# Patient Record
Sex: MALE | Race: WHITE | NOT HISPANIC OR LATINO | Employment: UNEMPLOYED | URBAN - METROPOLITAN AREA
[De-identification: names, ages, dates, MRNs, and addresses within clinical notes are randomized per-mention and may not be internally consistent; named-entity substitution may affect disease eponyms.]

---

## 2021-05-26 ENCOUNTER — RECORDS - HEALTHEAST (OUTPATIENT)
Dept: ADMINISTRATIVE | Facility: CLINIC | Age: 41
End: 2021-05-26

## 2024-02-18 ENCOUNTER — APPOINTMENT (OUTPATIENT)
Dept: RADIOLOGY | Facility: CLINIC | Age: 44
End: 2024-02-18
Attending: EMERGENCY MEDICINE
Payer: COMMERCIAL

## 2024-02-18 ENCOUNTER — HOSPITAL ENCOUNTER (EMERGENCY)
Facility: CLINIC | Age: 44
Discharge: HOME | End: 2024-02-18
Attending: EMERGENCY MEDICINE | Admitting: EMERGENCY MEDICINE
Payer: COMMERCIAL

## 2024-02-18 VITALS
HEIGHT: 76 IN | DIASTOLIC BLOOD PRESSURE: 92 MMHG | OXYGEN SATURATION: 98 % | RESPIRATION RATE: 16 BRPM | BODY MASS INDEX: 21.31 KG/M2 | TEMPERATURE: 97 F | HEART RATE: 103 BPM | WEIGHT: 175 LBS | SYSTOLIC BLOOD PRESSURE: 135 MMHG

## 2024-02-18 DIAGNOSIS — S61.412A LACERATION OF LEFT HAND, FOREIGN BODY PRESENCE UNSPECIFIED, INITIAL ENCOUNTER: ICD-10-CM

## 2024-02-18 PROCEDURE — 12001 RPR S/N/AX/GEN/TRNK 2.5CM/<: CPT

## 2024-02-18 PROCEDURE — 99283 EMERGENCY DEPT VISIT LOW MDM: CPT | Mod: 25

## 2024-02-18 PROCEDURE — 250N000011 HC RX IP 250 OP 636: Performed by: EMERGENCY MEDICINE

## 2024-02-18 PROCEDURE — 90715 TDAP VACCINE 7 YRS/> IM: CPT | Performed by: EMERGENCY MEDICINE

## 2024-02-18 PROCEDURE — 90471 IMMUNIZATION ADMIN: CPT | Performed by: EMERGENCY MEDICINE

## 2024-02-18 PROCEDURE — 73130 X-RAY EXAM OF HAND: CPT | Mod: LT

## 2024-02-18 RX ADMIN — CLOSTRIDIUM TETANI TOXOID ANTIGEN (FORMALDEHYDE INACTIVATED), CORYNEBACTERIUM DIPHTHERIAE TOXOID ANTIGEN (FORMALDEHYDE INACTIVATED), BORDETELLA PERTUSSIS TOXOID ANTIGEN (GLUTARALDEHYDE INACTIVATED), BORDETELLA PERTUSSIS FILAMENTOUS HEMAGGLUTININ ANTIGEN (FORMALDEHYDE INACTIVATED), BORDETELLA PERTUSSIS PERTACTIN ANTIGEN, AND BORDETELLA PERTUSSIS FIMBRIAE 2/3 ANTIGEN 0.5 ML: 5; 2; 2.5; 5; 3; 5 INJECTION, SUSPENSION INTRAMUSCULAR at 01:45

## 2024-02-18 NOTE — ED PROVIDER NOTES
EMERGENCY DEPARTMENT ENCOUNTER      NAME: Francisco Way  AGE: 43 year old male  YOB: 1980  MRN: [id number]  EVALUATION DATE & TIME: 2/18/2024  1:12 AM    PCP: System, Provider Not In    ED PROVIDER: Cheyenne Crawford M.D.      Chief Complaint   Patient presents with    Laceration     Left hand          FINAL IMPRESSION:  1. Laceration of left hand, foreign body presence unspecified, initial encounter        MEDICAL DECISION MAKING:    Pertinent Labs & Imaging studies reviewed. (See chart for details)  ED Course as of 02/18/24 0214   Sun Feb 18, 2024   0136 Afebrile.  Vital signs here with tachycardia though patient is quite agitated.  He is coming in with laceration to the palm of his left hand.  Says he fell in his aunts parking garage.  He thinks he cut his hand on a piece of glass.  No other injury sustained.  Unknown last tetanus.    Reviewed, last tetanus was in 2015 so we will update here.    Patient here initially quite histrionic, screaming in the room.  He has a 2 cm laceration to his thenar eminence on the left-hand side.  On evaluation he has a small surface arterial bleed.  This was relieved with direct pressure.  Moving the rest of his fingers appropriately.  Cap refill appropriate on distal nailbeds.  No sensory deficits.    Area was anesthetized.  Was cleaned out here with 250 mL of pressurized saline.  Will get an x-ray to evaluate for any foreign body.       0210 Patient's x-ray here reveals a small 3 mm foreign body.  Area was flushed, wound was explored.  No foreign body visualized.  No further bleeding and no further arterial bleeding.  Area was closed with three 4-0 Ethilon sutures.  Patient tolerated procedure well.  Much more calm at this time.  Plan to discharge home with follow-up with [facility] if needed.         Medical Decision Making  Obtained supplemental history:Supplemental history obtained?: Family Member/Significant Other  Reviewed external records: External  records reviewed?: Documented in chart and Inpatient Record: Primary care doctor's office visit from 12/20/2018.  Care impacted by chronic illness:N/A  Care significantly affected by social determinants of health:N/A  Did you consider but not order tests?: Work up considered but not performed and documented in chart, if applicable  Did you interpret images independently?: Independent interpretation of ECG and images noted in documentation, when applicable.  Consultation discussion with other provider:Did you involve another provider (consultant, , pharmacy, etc.)?: No  Discharge. No recommendations on prescription strength medication(s). See documentation for any additional details.      Critical care: 0 minutes excluding separately billable procedures.  Includes bedside management, time reviewing test results, review of records, discussing the case with staff, documenting the medical record and time spent with family members (or surrogate decision makers) discussing specific treatment issues.          ED COURSE:  1:28 AM I met the patient and performed my initial interview and exam.   1:55 AM I performed the laceration repair procedure.    The importance of close follow up was discussed. We reviewed warning signs and symptoms, and I instructed Mr. Way to return to the emergency department immediately if he develops any new or worsening symptoms. I provided additional verbal discharge instructions. Mr. Way expressed understanding and agreement with this plan of care, his questions were answered, and he was discharged in stable condition.     MEDICATIONS GIVEN IN THE EMERGENCY:  Medications   Tdap (tetanus-diphtheria-acell pertussis) (ADACEL) injection 0.5 mL (0.5 mLs Intramuscular $Given 2/18/24 0807)       NEW PRESCRIPTIONS STARTED AT TODAY'S ER VISIT:  New Prescriptions    No medications on file          =================================================================    HPI    Patient information was  "obtained from: Patient     Use of : N/A         Francisco Way is a 43 year old male who presents to the ED by private vehicle for evaluation of a left hand laceration.    He is coming in with laceration to the palm of his left hand.  Says he fell in his aunts parking garage.  He thinks he cut his hand on a piece of glass.  No other injury sustained.  Last tetanus was in 2015.      REVIEW OF SYSTEMS   Review of Systems   Skin:         Positive for laceration on palm of left hand.   All other systems reviewed and are negative.      PAST MEDICAL HISTORY:  History reviewed. No pertinent past medical history.    PAST SURGICAL HISTORY:  History reviewed. No pertinent surgical history.    CURRENT MEDICATIONS:    No current facility-administered medications for this encounter.  No current outpatient medications on file.    ALLERGIES:  Allergies   Allergen Reactions    Sulfa Antibiotics        FAMILY HISTORY:  History reviewed. No pertinent family history.    SOCIAL HISTORY:   Social History     Socioeconomic History    Marital status: Single       PHYSICAL EXAM:    Vitals: BP (!) 135/92   Pulse 103   Temp 97  F (36.1  C) (Oral)   Resp 16   Ht 1.93 m (6' 4\")   Wt 79.4 kg (175 lb)   SpO2 98%   BMI 21.30 kg/m     General:. Alert and interactive, histrionic, screaming in the room.  HENT: Oropharynx without erythema or exudates. MMM.  TMs clear bilaterally.  Eyes: Pupils mid-sized and equally reactive.   Neck: Full AROM.  No midline tenderness to palpation.  Cardiovascular: Regular rate and rhythm. Peripheral pulses 2+ bilaterally.  Chest/Pulmonary: Normal work of breathing. Lung sounds clear and equal throughout, no wheezes or crackles. No chest wall tenderness or deformities.  Abdomen: Soft, nondistended. Nontender without guarding or rebound.  Back/Spine: No CVA or midline tenderness.  Extremities: Normal ROM of all major joints. No lower extremity edema.   Skin: Warm and dry. Normal skin color. 2 cm " laceration to his thenar eminence on the left-hand side, small surface arterial bleed. Cap refill appropriate on distal nailbeds.   Neuro: Speech clear. CNs grossly intact. Moves all extremities appropriately. Strength and sensation grossly intact to all extremities. Moving the rest of his fingers appropriately. No sensory deficits.  Psych: Normal affect/mood, cooperative, memory appropriate.       LAB:  All pertinent labs reviewed and interpreted.  Labs Ordered and Resulted from Time of ED Arrival to Time of ED Departure - No data to display    RADIOLOGY:  XR Hand Left G/E 3 Views   Final Result   IMPRESSION: Normal joint spaces and alignment. No fracture. A 3 mm radiopaque foreign body is seen on the palmar soft tissue on the lateral view only, exact position unclear but possibly in the thenar region.            PROCEDURES:   PROCEDURE: Laceration Repair   INDICATIONS: Laceration   PROCEDURE PROVIDER: Dr Tamara Crawford   SITE: Thenar eminence on the left-hand side    TYPE/SIZE: simple, clean, and no foreign body visualized  2 cm (total length)   FUNCTIONAL ASSESSMENT: Distal sensation, circulation, and motor intact   MEDICATION: 5 mLs of 1% Lidocaine with epinephrine   PREPARATION: irrigation with Normal saline   DEBRIDEMENT: wound explored, no foreign body found   CLOSURE:  Superficial layer closed with 3 stitches of 4-0 Ethilon simple interrupted    Total number of sutures/staples placed: 3         I, Delma Quach, am serving as a scribe to document services personally performed by Dr. Cheyenne Crawford  based on my observation and the provider's statements to me. I, Cheyenne Crawford MD attest that Delma Quach is acting in a scribe capacity, has observed my performance of the services and has documented them in accordance with my direction.      Cheyenne Crawford M.D.  Emergency Medicine  [facility] EMERGENCY ROOM  [address]  [id number]  [phone]  Dept: [phone]     Cheyenne Crawford MD  02/18/24 0224

## 2024-02-18 NOTE — ED TRIAGE NOTES
Patient reports falling 1 hour pta in Aunt's parking garage, sustained puncture to left hand. Unsure what he cut his hand on      Triage Assessment (Adult)       Row Name 02/18/24 0101          Triage Assessment    Airway WDL WDL        Respiratory WDL    Respiratory WDL WDL        Skin Circulation/Temperature WDL    Skin Circulation/Temperature WDL WDL        Cardiac WDL    Cardiac WDL WDL        Peripheral/Neurovascular WDL    Peripheral Neurovascular WDL WDL        Cognitive/Neuro/Behavioral WDL    Cognitive/Neuro/Behavioral WDL WDL
